# Patient Record
Sex: MALE | Race: WHITE
[De-identification: names, ages, dates, MRNs, and addresses within clinical notes are randomized per-mention and may not be internally consistent; named-entity substitution may affect disease eponyms.]

---

## 2017-03-05 ENCOUNTER — HOSPITAL ENCOUNTER (OUTPATIENT)
Dept: HOSPITAL 47 - EC | Age: 72
Setting detail: OBSERVATION
LOS: 1 days | Discharge: HOME | End: 2017-03-06
Attending: HOSPITALIST | Admitting: HOSPITALIST
Payer: MEDICARE

## 2017-03-05 VITALS — BODY MASS INDEX: 29.5 KG/M2

## 2017-03-05 DIAGNOSIS — R63.4: ICD-10-CM

## 2017-03-05 DIAGNOSIS — Z79.51: ICD-10-CM

## 2017-03-05 DIAGNOSIS — Z88.8: ICD-10-CM

## 2017-03-05 DIAGNOSIS — Z79.82: ICD-10-CM

## 2017-03-05 DIAGNOSIS — Z79.899: ICD-10-CM

## 2017-03-05 DIAGNOSIS — E11.40: ICD-10-CM

## 2017-03-05 DIAGNOSIS — E86.0: ICD-10-CM

## 2017-03-05 DIAGNOSIS — E11.649: ICD-10-CM

## 2017-03-05 DIAGNOSIS — E87.2: ICD-10-CM

## 2017-03-05 DIAGNOSIS — R55: Primary | ICD-10-CM

## 2017-03-05 DIAGNOSIS — N17.9: ICD-10-CM

## 2017-03-05 DIAGNOSIS — I10: ICD-10-CM

## 2017-03-05 DIAGNOSIS — E78.5: ICD-10-CM

## 2017-03-05 DIAGNOSIS — Z79.84: ICD-10-CM

## 2017-03-05 DIAGNOSIS — Z87.891: ICD-10-CM

## 2017-03-05 LAB
ALP SERPL-CCNC: 70 U/L (ref 38–126)
ALT SERPL-CCNC: 43 U/L (ref 21–72)
ANION GAP SERPL CALC-SCNC: 20 MMOL/L
APTT BLD: 21.4 SEC (ref 22–30)
AST SERPL-CCNC: 32 U/L (ref 17–59)
BASOPHILS # BLD AUTO: 0 K/UL (ref 0–0.2)
BASOPHILS NFR BLD AUTO: 0 %
BUN SERPL-SCNC: 14 MG/DL (ref 9–20)
CALCIUM SPEC-MCNC: 9.5 MG/DL (ref 8.4–10.2)
CH: 30.4
CHCM: 33.8
CHLORIDE SERPL-SCNC: 106 MMOL/L (ref 98–107)
CK SERPL-CCNC: 69 U/L (ref 55–170)
CO2 SERPL-SCNC: 14 MMOL/L (ref 22–30)
EOSINOPHIL # BLD AUTO: 0.1 K/UL (ref 0–0.7)
EOSINOPHIL NFR BLD AUTO: 1 %
ERYTHROCYTE [DISTWIDTH] IN BLOOD BY AUTOMATED COUNT: 4.51 M/UL (ref 4.3–5.9)
ERYTHROCYTE [DISTWIDTH] IN BLOOD: 12.9 % (ref 11.5–15.5)
GLUCOSE BLD-MCNC: 100 MG/DL (ref 75–99)
GLUCOSE BLD-MCNC: 107 MG/DL (ref 75–99)
GLUCOSE BLD-MCNC: 195 MG/DL (ref 75–99)
GLUCOSE SERPL-MCNC: 106 MG/DL (ref 74–99)
HCT VFR BLD AUTO: 40.8 % (ref 39–53)
HDW: 2.76
HGB BLD-MCNC: 13.5 GM/DL (ref 13–17.5)
INR PPP: 1.1 (ref ?–1.1)
LUC NFR BLD AUTO: 4 %
LYMPHOCYTES # SPEC AUTO: 2.4 K/UL (ref 1–4.8)
LYMPHOCYTES NFR SPEC AUTO: 26 %
MAGNESIUM SPEC-SCNC: 1.9 MG/DL (ref 1.6–2.3)
MCH RBC QN AUTO: 30 PG (ref 25–35)
MCHC RBC AUTO-ENTMCNC: 33.2 G/DL (ref 31–37)
MCV RBC AUTO: 90.4 FL (ref 80–100)
MONOCYTES # BLD AUTO: 0.4 K/UL (ref 0–1)
MONOCYTES NFR BLD AUTO: 4 %
NEUTROPHILS # BLD AUTO: 6.1 K/UL (ref 1.3–7.7)
NEUTROPHILS NFR BLD AUTO: 65 %
NON-AFRICAN AMERICAN GFR(MDRD): 54
PARTICLE COUNT: 2532
PH UR: 5 [PH] (ref 5–8)
PHOSPHATE SERPL-MCNC: 2.8 MG/DL (ref 2.5–4.5)
POTASSIUM SERPL-SCNC: 4.3 MMOL/L (ref 3.5–5.1)
PROT SERPL-MCNC: 6.9 G/DL (ref 6.3–8.2)
PROT UR QL: (no result)
PT BLD: 11 SEC (ref 9–12)
RBC UR QL: 2 /HPF (ref 0–5)
SODIUM SERPL-SCNC: 140 MMOL/L (ref 137–145)
SP GR UR: 1.02 (ref 1–1.03)
TROPONIN I SERPL-MCNC: <0.012 NG/ML (ref 0–0.03)
UA BILLING (MACRO VS. MICRO): (no result)
UROBILINOGEN UR QL STRIP: <2 MG/DL (ref ?–2)
WBC # BLD AUTO: 0.4 10*3/UL
WBC # BLD AUTO: 9.4 K/UL (ref 3.8–10.6)
WBC #/AREA URNS HPF: 1 /HPF (ref 0–5)
WBC (PEROX): 10.06

## 2017-03-05 PROCEDURE — 81001 URINALYSIS AUTO W/SCOPE: CPT

## 2017-03-05 PROCEDURE — 96361 HYDRATE IV INFUSION ADD-ON: CPT

## 2017-03-05 PROCEDURE — 80053 COMPREHEN METABOLIC PANEL: CPT

## 2017-03-05 PROCEDURE — 99285 EMERGENCY DEPT VISIT HI MDM: CPT

## 2017-03-05 PROCEDURE — 87086 URINE CULTURE/COLONY COUNT: CPT

## 2017-03-05 PROCEDURE — 85610 PROTHROMBIN TIME: CPT

## 2017-03-05 PROCEDURE — 84484 ASSAY OF TROPONIN QUANT: CPT

## 2017-03-05 PROCEDURE — 85730 THROMBOPLASTIN TIME PARTIAL: CPT

## 2017-03-05 PROCEDURE — 82553 CREATINE MB FRACTION: CPT

## 2017-03-05 PROCEDURE — 84100 ASSAY OF PHOSPHORUS: CPT

## 2017-03-05 PROCEDURE — 83735 ASSAY OF MAGNESIUM: CPT

## 2017-03-05 PROCEDURE — 93005 ELECTROCARDIOGRAM TRACING: CPT

## 2017-03-05 PROCEDURE — 36415 COLL VENOUS BLD VENIPUNCTURE: CPT

## 2017-03-05 PROCEDURE — 71020: CPT

## 2017-03-05 PROCEDURE — 85025 COMPLETE CBC W/AUTO DIFF WBC: CPT

## 2017-03-05 PROCEDURE — 70450 CT HEAD/BRAIN W/O DYE: CPT

## 2017-03-05 PROCEDURE — 80320 DRUG SCREEN QUANTALCOHOLS: CPT

## 2017-03-05 PROCEDURE — 96374 THER/PROPH/DIAG INJ IV PUSH: CPT

## 2017-03-05 PROCEDURE — 95819 EEG AWAKE AND ASLEEP: CPT

## 2017-03-05 PROCEDURE — 82550 ASSAY OF CK (CPK): CPT

## 2017-03-05 RX ADMIN — CEFAZOLIN SCH MLS/HR: 330 INJECTION, POWDER, FOR SOLUTION INTRAMUSCULAR; INTRAVENOUS at 15:32

## 2017-03-05 NOTE — CT
EXAMINATION TYPE: CT brain wo con

 

DATE OF EXAM: 3/5/2017 2:31 PM

 

COMPARISON: NONE

 

HISTORY: Syncopal episode.

 

CT DLP: 1096.10 mGycm

Automated exposure control for dose reduction was used.

 

FINDINGS: 

There are mild, generalized changes of sulcal prominence and ventriculomegaly, compatible with mild a
trophic change. There is diffuse periventricular white matter lucency, compatible with chronic white 
matter ischemic change. There is no focal lesion, mass effect or midline shift identified. I do not s
ee evidence of intracranial blood.

 

Visualized portions of the paranasal sinuses and mastoids are clear.

 

IMPRESSION: 

1. NO ACUTE INTRACRANIAL ABNORMALITY.

2. MILD ATROPHIC CHANGE.

3. CHRONIC WHITE MATTER ISCHEMIC CHANGE.

## 2017-03-05 NOTE — XR
EXAMINATION TYPE: XR chest 2V

 

DATE OF EXAM: 3/5/2017 2:33 PM

 

HISTORY: Weakness.

 

REFERENCE: NONE.

 

FINDINGS: Heart size is upper limits of normal. The lungs are clear. Pleural spaces are clear.

 

IMPRESSION: 

 

BORDERLINE CARDIOMEGALY.

## 2017-03-05 NOTE — ED
General Adult HPI





- General


Stated complaint: SEIZURE


Time Seen by Provider: 03/05/17 13:56


Source: RN notes reviewed, old records reviewed





- History of Present Illness


Initial comments: 





This is a 71-year-old male the ER for evaluation.  Patient has no recollections 

of events.  Patient was In the grocery store and per bystanders EMS and the 

history obtained from the chart patient hasn't difficulty fumbling to pay for 

his meal and he then had a fall and then had a seizure.  Patient has no medical 

history of seizures, denies drugs or alcohol, does have history of diabetes and 

high cholesterol, has been taking medication as prescribed





- Related Data


 Allergies











Allergy/AdvReac Type Severity Reaction Status Date / Time


 


No Known Allergies Allergy   Verified 03/05/17 14:03














Review of Systems


ROS Statement: 


Those systems with pertinent positive or pertinent negative responses have been 

documented in the HPI.





ROS Other: All systems not noted in ROS Statement are negative.





General Exam


General appearance: alert, in no apparent distress


Head exam: Present: atraumatic, normocephalic, normal inspection


Eye exam: Present: normal appearance, PERRL, EOMI.  Absent: scleral icterus, 

conjunctival injection, periorbital swelling


ENT exam: Present: normal exam, mucous membranes moist


Neck exam: Present: normal inspection.  Absent: tenderness, meningismus, 

lymphadenopathy


Respiratory exam: Present: normal lung sounds bilaterally.  Absent: respiratory 

distress, wheezes, rales, rhonchi, stridor


Cardiovascular Exam: Present: regular rate, normal rhythm, normal heart sounds.

  Absent: systolic murmur, diastolic murmur, rubs, gallop, clicks


GI/Abdominal exam: Present: soft, normal bowel sounds.  Absent: distended, 

tenderness, guarding, rebound, rigid


Extremities exam: Present: normal inspection, full ROM, normal capillary 

refill.  Absent: tenderness, pedal edema, joint swelling, calf tenderness


Back exam: Present: normal inspection


Neurological exam: Present: alert, oriented X3, CN II-XII intact


Psychiatric exam: Present: normal affect, normal mood


Skin exam: Present: warm, dry, intact, normal color.  Absent: rash





Course


 Vital Signs











  03/05/17 03/05/17





  14:03 14:37


 


Temperature 97.0 F L 


 


Pulse Rate 93 84


 


Respiratory 18 18





Rate  


 


Blood Pressure 159/79 133/60


 


O2 Sat by Pulse 96 97





Oximetry  














- Reevaluation(s)


Reevaluation #1: 





03/05/17 15:09


Patient remains without syncope or seizure here in the emergency room


Reevaluation #2: 





03/05/17 15:09


Speaking with wife, patient does seem to have episodes with his glipizide of 

hypoglycemia that makes him have his altered mental status issues





EKG Findings





- EKG Comments:


EKG Findings:: EKG shows normal sinus rhythm rate of 93, , QRS 90, 





Medical Decision Making





- Medical Decision Making





71-year-old ER for evaluation of syncope and seizure, CT is normal, lab work is 

normal, blood sugar is maintained normal throughout stay, patient asymptomatic 

has no recollection of events, patient be admitted for neurological evaluation





- Lab Data


Result diagrams: 


 03/05/17 14:10





 03/05/17 14:10


 Lab Results











  03/05/17 03/05/17 03/05/17 Range/Units





  14:10 14:10 14:10 


 


WBC    9.4  (3.8-10.6)  k/uL


 


RBC    4.51  (4.30-5.90)  m/uL


 


Hgb    13.5  (13.0-17.5)  gm/dL


 


Hct    40.8  (39.0-53.0)  %


 


MCV    90.4  (80.0-100.0)  fL


 


MCH    30.0  (25.0-35.0)  pg


 


MCHC    33.2  (31.0-37.0)  g/dL


 


RDW    12.9  (11.5-15.5)  %


 


Plt Count    273  (150-450)  k/uL


 


Neutrophils %    65  %


 


Lymphocytes %    26  %


 


Monocytes %    4  %


 


Eosinophils %    1  %


 


Basophils %    0  %


 


Neutrophils #    6.1  (1.3-7.7)  k/uL


 


Lymphocytes #    2.4  (1.0-4.8)  k/uL


 


Monocytes #    0.4  (0-1.0)  k/uL


 


Eosinophils #    0.1  (0-0.7)  k/uL


 


Basophils #    0.0  (0-0.2)  k/uL


 


Sodium  140    (137-145)  mmol/L


 


Potassium  4.3    (3.5-5.1)  mmol/L


 


Chloride  106    ()  mmol/L


 


Carbon Dioxide  14 L    (22-30)  mmol/L


 


Anion Gap  20    mmol/L


 


BUN  14    (9-20)  mg/dL


 


Creatinine  1.30 H    (0.66-1.25)  mg/dL


 


Est GFR (MDRD) Af Amer  >60    (>60 ml/min/1.73 sqM)  


 


Est GFR (MDRD) Non-Af  54    (>60 ml/min/1.73 sqM)  


 


Glucose  106 H    (74-99)  mg/dL


 


POC Glucose (mg/dL)     (75-99)  mg/dL


 


POC Glu Operater ID     


 


Calcium  9.5    (8.4-10.2)  mg/dL


 


Phosphorus  2.8    (2.5-4.5)  mg/dL


 


Magnesium  1.9    (1.6-2.3)  mg/dL


 


Total Bilirubin  0.6    (0.2-1.3)  mg/dL


 


AST  32    (17-59)  U/L


 


ALT  43    (21-72)  U/L


 


Alkaline Phosphatase  70    ()  U/L


 


Total Creatine Kinase   69   ()  U/L


 


CK-MB (CK-2)   0.7   (0.0-2.4)  ng/mL


 


CK-MB (CK-2) Rel Index   1.0   


 


Troponin I   <0.012   (0.000-0.034)  ng/mL


 


Total Protein  6.9    (6.3-8.2)  g/dL


 


Albumin  4.1    (3.5-5.0)  g/dL


 


Urine Color     


 


Urine Appearance     (Clear)  


 


Urine pH     (5.0-8.0)  


 


Ur Specific Gravity     (1.001-1.035)  


 


Urine Protein     (Negative)  


 


Urine Glucose (UA)     (Negative)  


 


Urine Ketones     (Negative)  


 


Urine Blood     (Negative)  


 


Urine Nitrate     (Negative)  


 


Urine Bilirubin     (Negative)  


 


Urine Urobilinogen     (<2.0)  mg/dL


 


Ur Leukocyte Esterase     (Negative)  


 


Urine RBC     (0-5)  /hpf


 


Urine WBC     (0-5)  /hpf


 


Urine Mucus     (None)  /hpf


 


Urine Sperm     (None)  /hpf


 


Serum Alcohol  <10    mg/dL














  03/05/17 03/05/17 Range/Units





  14:12 14:45 


 


WBC    (3.8-10.6)  k/uL


 


RBC    (4.30-5.90)  m/uL


 


Hgb    (13.0-17.5)  gm/dL


 


Hct    (39.0-53.0)  %


 


MCV    (80.0-100.0)  fL


 


MCH    (25.0-35.0)  pg


 


MCHC    (31.0-37.0)  g/dL


 


RDW    (11.5-15.5)  %


 


Plt Count    (150-450)  k/uL


 


Neutrophils %    %


 


Lymphocytes %    %


 


Monocytes %    %


 


Eosinophils %    %


 


Basophils %    %


 


Neutrophils #    (1.3-7.7)  k/uL


 


Lymphocytes #    (1.0-4.8)  k/uL


 


Monocytes #    (0-1.0)  k/uL


 


Eosinophils #    (0-0.7)  k/uL


 


Basophils #    (0-0.2)  k/uL


 


Sodium    (137-145)  mmol/L


 


Potassium    (3.5-5.1)  mmol/L


 


Chloride    ()  mmol/L


 


Carbon Dioxide    (22-30)  mmol/L


 


Anion Gap    mmol/L


 


BUN    (9-20)  mg/dL


 


Creatinine    (0.66-1.25)  mg/dL


 


Est GFR (MDRD) Af Amer    (>60 ml/min/1.73 sqM)  


 


Est GFR (MDRD) Non-Af    (>60 ml/min/1.73 sqM)  


 


Glucose    (74-99)  mg/dL


 


POC Glucose (mg/dL)  100 H   (75-99)  mg/dL


 


POC Glu Operater ID  Branch, Curt   


 


Calcium    (8.4-10.2)  mg/dL


 


Phosphorus    (2.5-4.5)  mg/dL


 


Magnesium    (1.6-2.3)  mg/dL


 


Total Bilirubin    (0.2-1.3)  mg/dL


 


AST    (17-59)  U/L


 


ALT    (21-72)  U/L


 


Alkaline Phosphatase    ()  U/L


 


Total Creatine Kinase    ()  U/L


 


CK-MB (CK-2)    (0.0-2.4)  ng/mL


 


CK-MB (CK-2) Rel Index    


 


Troponin I    (0.000-0.034)  ng/mL


 


Total Protein    (6.3-8.2)  g/dL


 


Albumin    (3.5-5.0)  g/dL


 


Urine Color   Yellow  


 


Urine Appearance   Clear  (Clear)  


 


Urine pH   5.0  (5.0-8.0)  


 


Ur Specific Gravity   1.016  (1.001-1.035)  


 


Urine Protein   1+ H  (Negative)  


 


Urine Glucose (UA)   Negative  (Negative)  


 


Urine Ketones   Trace H  (Negative)  


 


Urine Blood   Negative  (Negative)  


 


Urine Nitrate   Negative  (Negative)  


 


Urine Bilirubin   Negative  (Negative)  


 


Urine Urobilinogen   <2.0  (<2.0)  mg/dL


 


Ur Leukocyte Esterase   Negative  (Negative)  


 


Urine RBC   2  (0-5)  /hpf


 


Urine WBC   1  (0-5)  /hpf


 


Urine Mucus   Rare H  (None)  /hpf


 


Urine Sperm   Few H  (None)  /hpf


 


Serum Alcohol    mg/dL














- Radiology Data


Radiology results: report reviewed (Chest x-ray negative for acute disease, CT 

brain is negative for acute disease), image reviewed





Disposition


Clinical Impression: 


 Dehydration, Vasovagal syncope, New onset seizure





Disposition: ADMITTED AS IP TO THIS HOSP


Condition: Fair


Referrals: 


Adrian Noble DO [Primary Care Provider] - 1-2 days

## 2017-03-06 VITALS — SYSTOLIC BLOOD PRESSURE: 131 MMHG | TEMPERATURE: 97.8 F | DIASTOLIC BLOOD PRESSURE: 66 MMHG | HEART RATE: 86 BPM

## 2017-03-06 VITALS — RESPIRATION RATE: 18 BRPM

## 2017-03-06 LAB
GLUCOSE BLD-MCNC: 132 MG/DL (ref 75–99)
GLUCOSE BLD-MCNC: 81 MG/DL (ref 75–99)
GLUCOSE BLD-MCNC: 84 MG/DL (ref 75–99)
GLUCOSE BLD-MCNC: 98 MG/DL (ref 75–99)

## 2017-03-06 RX ADMIN — CEFAZOLIN SCH MLS/HR: 330 INJECTION, POWDER, FOR SOLUTION INTRAMUSCULAR; INTRAVENOUS at 03:25

## 2017-03-06 RX ADMIN — FLUTICASONE PROPIONATE SCH SPRAY: 50 SPRAY, METERED NASAL at 09:39

## 2017-03-06 RX ADMIN — FLUTICASONE PROPIONATE SCH: 50 SPRAY, METERED NASAL at 09:41

## 2017-03-06 RX ADMIN — CEFAZOLIN SCH MLS/HR: 330 INJECTION, POWDER, FOR SOLUTION INTRAMUSCULAR; INTRAVENOUS at 11:12

## 2017-03-06 NOTE — P.HPIM
History of Present Illness


H&P Date: 03/06/17 (Discharge summary)


This is a 71-year-old gentleman who is currently on metformin and glipizide 10 

mg twice a day for diabetes mellitus type 2 was admitted after patient had a 

syncopal episode.  Patient was a furniture store.  Patient did not have any 

aura prior to the episode currently fainted.  Patient was evaluated by the EMS 

at that time was noted to have a blood glucose level of 61 according to his 

wife.  Pt was brought into the hospital was that evaluated did not note any 

focal neural deficits.  Patient does not have a prior history of syncope.  

Patient denies having any history of heart disease, previous strokes, carotid 

disease.





Patient apparently used to weigh 220 pounds and currently is at 185 pounds.  

Patient states that he does not check his blood glucose levels in the recent 

times.





In doing the hospital physician on the current regimen patient did have 2 

episodes of mild hypoglycemia however was asymptomatic.  Patient also stated 

that the day of the episode patient did not have anything to eat however did 

take take his medications.








Review of Systems


All systems: negative (Noted in HPI)





Past Medical History


Past Medical History: Diabetes Mellitus


Additional Past Medical History / Comment(s): neuropathy


History of Any Multi-Drug Resistant Organisms: None Reported


Past Surgical History: No Surgical Hx Reported


Past Psychological History: PTSD


Smoking Status: Former smoker


Past Alcohol Use History: None Reported


Past Drug Use History: None Reported





Medications and Allergies


 Home Medications











 Medication  Instructions  Recorded  Confirmed  Type


 


Aspirin EC [Ecotrin Low Dose] 81 mg PO DAILY 03/05/17 03/05/17 History


 


Atorvastatin Calcium [Lipitor] 80 mg PO HS 03/05/17 03/05/17 History


 


Fluticasone Nasal Spray [Flonase 1 spray EA NOSTRIL DAILY 03/05/17 03/05/17 

History





Nasal [place]]    


 


HYDROcodone/APAP 7.5-325MG [Norco 1 tab PO BID PRN 03/05/17 03/05/17 History





7.5-325]    


 


Lisinopril [Zestril] 5 mg PO QAM 03/05/17 03/05/17 History


 


Sertraline [Zoloft] 100 mg PO DAILY 03/05/17 03/05/17 History











 Allergies











Allergy/AdvReac Type Severity Reaction Status Date / Time


 


flunisolide AdvReac  Burning Verified 03/05/17 15:21





   Sensation  














Physical Exam


Vitals: 


 Vital Signs











  Temp Pulse Resp BP Pulse Ox


 


 03/06/17 16:00  97.8 F  86  18  131/66  97


 


 03/06/17 11:30  97.5 F L  97  18  156/76  96


 


 03/06/17 09:39  96.1 F L  97  18  161/72  96


 


 03/06/17 03:48  97.6 F  81  18  120/58  97


 


 03/05/17 23:12  97.2 F L  88  19  116/56  97


 


 03/05/17 19:47  97.9 F  105 H  18  132/63  96








 Intake and Output











 03/06/17 03/06/17 03/06/17





 06:59 14:59 22:59


 


Intake Total 400 1520 


 


Output Total 1450  


 


Balance -1050 1520 


 


Intake:   


 


   600 


 


    Sodium Chloride 0.9% 1, 400 600 





    000 ml @ 100 mls/hr IV .   





    Q10H LARA Rx#:[id number]   


 


  Oral  920 


 


Output:   


 


  Urine 1450  


 


Other:   


 


  Voiding Method Urinal Urinal Urinal


 


  Weight 84.9 kg  











Physical exam Gen. appearance oriented 3 in no distress





Neck is supple no JVD





Lungs good air entry clear to auscultation no rhonchi or wheezing





Heart S1-S2 heard regular rate and rhythm no murmurs appreciated 





Abdomen is soft nontender no organomegaly bowel sounds are intact





Neurologically cranial nerves II-12 grossly intact no focal motor or sensory 

deficits noted.  Gait is within normal limits





Skin no abnormalities appreciated








Results


CBC & Chem 7: 


 03/05/17 14:10





 03/05/17 14:10


Labs: 


 Abnormal Lab Results - Last 24 Hours (Table)











  03/05/17 03/06/17 Range/Units





  20:23 06:27 


 


POC Glucose (mg/dL)  195 H  132 H  (75-99)  mg/dL














Assessment and Plan


Plan: 


#1 syncope likely secondary to hypoglycemia





#2 anion gap metabolic acidosis on admission however patient is asymptomatic 

and this is incidental





#3 diabetes mellitus type II with recent weight loss





#3 ketones likely secondary to poor oral intake which is likely the cause of #2





#4 history of hypertension





#5 dyslipidemia #6 acute kidney injury





Plan





Patient is asymptomatic is made to ambulate.  Patient is to be discharged home 

to follow-up with his primary care physician.  Patient's glipizide will be 

discontinued.  Patient's metformin will be decreased to 750 mg twice a day.  

Patient is encouraged to maintain a glucose diary's.  


 a get up and go test was done patient was asymptomatic hence is discharged 

home.

## 2023-04-07 ENCOUNTER — HOSPITAL ENCOUNTER (OUTPATIENT)
Dept: HOSPITAL 47 - RADECHMAIN | Age: 78
Discharge: HOME | End: 2023-04-07
Attending: CLINIC/CENTER
Payer: OTHER GOVERNMENT

## 2023-04-07 DIAGNOSIS — R01.1: Primary | ICD-10-CM

## 2023-04-07 PROCEDURE — 93306 TTE W/DOPPLER COMPLETE: CPT

## 2023-04-07 NOTE — CA
Transthoracic Echo Report 

 Name: Eulogio Gar 

 MRN:    [id number] 

 Age:    77     Gender:     M 

 

 :    1945 

 Exam Date:     2023 14:06 

 Exam Location: [facility] 

 Ht (in):     70     Wt (lb):     175 

 Ordering Physician:        [facility] 

 Attending/Referring Phys:         Selin Oneil  PAC 

 Technician         Bladimir Hussein RDCS 

 Procedure CPT: 

 Indications:       R01.1 

 

 Cardiac Hx:        HTN;D.M.; High Cholesterol 

 Technical Quality:      Fair 

 Contrast 1:                                Total Dose (mL): 

 Contrast 2:                                Total Dose (mL): 

 

 MEASUREMENTS  (Male / Female) Normal Values 

 2D ECHO 

 LV Diastolic Diameter PLAX        4.7 cm                4.2 - 5.9 / 3.9 - 5.3 cm 

 LV Systolic Diameter PLAX         3.4 cm                 

 LV Fractional Shortening PLAX     27.0 %                 

 IVS Diastolic Thickness           0.9 cm                0.6 - 1.0 / 0.6 - 0.9 cm 

 IVS Systolic Thickness            1.5 cm                 

 LVPW Diastolic Thickness          1.1 cm                0.6 - 1.0 / 0.6 - 0.9 cm 

 LVPW Systolic Thickness           1.5 cm                 

 LV Relative Wall Thickness        0.4                    

 RV Internal Dim ED PLAX           2.9 cm                 

 LVOT Diameter                     2.0 cm                 

 LA Systolic Diameter LX           3.5 cm                3.0 - 4.0 / 2.7 - 3.8 cm 

 LV Diastolic Volume MOD BP        99.2 cm???              67 - 155 / 56 - 104 cm??? 

 LV Systolic Volume MOD BP         36.1 cm???              22 - 58 / 19 - 49 cm??? 

 LV Ejection Fraction MOD BP       63.6 %                >= 55  % 

 LV Stroke Volume MOD BP           63.1 cm???               

 LV Diastolic Volume MOD 4C        111.7 cm???              

 LV Systolic Volume MOD 4C         38.3 cm???               

 LV Ejection Fraction MOD 4C       65.7 %                 

 LV Stroke Volume MOD 4C           73.4 cm???               

 LV Diastolic Length 4C            7.9 cm                 

 LV Systolic Length 4C             6.0 cm                 

 LV Diastolic Volume MOD 2C        76.5 cm???               

 LV Systolic Volume MOD 2C         35.0 cm???               

 LV Ejection Fraction MOD 2C       54.3 %                 

 LV Stroke Volume MOD 2C           41.5 cm???               

 LV Diastolic Length 2C            6.8 cm                 

 LV Systolic Length 2C             5.9 cm                 

 Ascending Aorta Diameter          2.2 cm                 

 

 M-MODE 

 Aortic Root Diameter MM           2.7 cm                 

 LA Systolic Diameter MM           3.2 cm                 

 LA Ao Ratio MM                    1.2                    

 MV E Point Septal Separation      0.4 cm                 

 AV Cusp Separation MM             1.2 cm                 

 

 DOPPLER 

 AV Peak Velocity                  167.2 cm/s             

 AV Peak Gradient                  11.2 mmHg              

 MV Deceleration [place]             343.4 cm/s???            

 Mitral E Point Velocity           75.6 cm/s              

 Mitral A Point Velocity           90.0 cm/s              

 Mitral E to A Ratio               0.8                    

 MV Deceleration Time              220.3 ms               

 MV E' Velocity                    8.1 cm/s               

 Mitral E to MV E' Ratio           9.3                    

 TR Peak Velocity                  113.7 cm/s             

 TR Peak Gradient                  5.2 mmHg               

 Right Ventricular Systolic Press  10.2 mmHg              

 PV Peak Velocity                  113.7 cm/s             

 PV Peak Gradient                  5.2 mmHg               

 

 

 FINDINGS 

 Left Ventricle 

 Left ventricular ejection fraction is estimated at 55-60 %. Grade 1 diastolic  

 dysfunction. 

 

 Right Ventricle 

 Normal right ventricular size and function. 

 

 Right Atrium 

 Normal right atrial size. 

 

 Left Atrium 

 Normal left atrial size. 

 

 Mitral Valve 

 Mitral valve thickened. Trace mitral regurgitation. 

 

 Aortic Valve 

 Trileaflet aortic valve. 

 

 Tricuspid Valve 

 Trace to mild tricuspid regurgitation. 

 

 Pulmonic Valve 

 Valvular pulmonic stenosis. PVmax-1.35 m/sec. 

 

 Pericardium 

 Normal pericardium. No pericardial effusion. 

 

 Aorta 

 Normal size aortic root and proximal ascending aorta. 

 

 CONCLUSIONS 

 Normal LV systolic function 

 Previewed by:  

 Dr. Alen Demarco MD 

 (Electronically Signed) 

 Final Date:      2023 17:10